# Patient Record
Sex: FEMALE | Race: WHITE
[De-identification: names, ages, dates, MRNs, and addresses within clinical notes are randomized per-mention and may not be internally consistent; named-entity substitution may affect disease eponyms.]

---

## 2018-09-05 ENCOUNTER — HOSPITAL ENCOUNTER (INPATIENT)
Dept: HOSPITAL 62 - LC | Age: 28
LOS: 2 days | Discharge: HOME | End: 2018-09-07
Attending: OBSTETRICS & GYNECOLOGY | Admitting: OBSTETRICS & GYNECOLOGY
Payer: OTHER GOVERNMENT

## 2018-09-05 DIAGNOSIS — O26.893: Primary | ICD-10-CM

## 2018-09-05 DIAGNOSIS — Z3A.39: ICD-10-CM

## 2018-09-05 DIAGNOSIS — Z28.21: ICD-10-CM

## 2018-09-05 DIAGNOSIS — Z67.21: ICD-10-CM

## 2018-09-05 LAB
ADD MANUAL DIFF: NO
APPEARANCE UR: CLEAR
APTT PPP: YELLOW S
BARBITURATES UR QL SCN: NEGATIVE
BASOPHILS # BLD AUTO: 0 10^3/UL (ref 0–0.2)
BASOPHILS NFR BLD AUTO: 0.2 % (ref 0–2)
BILIRUB UR QL STRIP: NEGATIVE
EOSINOPHIL # BLD AUTO: 0 10^3/UL (ref 0–0.6)
EOSINOPHIL NFR BLD AUTO: 0.2 % (ref 0–6)
ERYTHROCYTE [DISTWIDTH] IN BLOOD BY AUTOMATED COUNT: 14.4 % (ref 11.5–14)
GLUCOSE UR STRIP-MCNC: NEGATIVE MG/DL
HCT VFR BLD CALC: 34.4 % (ref 36–47)
HGB BLD-MCNC: 11.3 G/DL (ref 12–15.5)
KETONES UR STRIP-MCNC: NEGATIVE MG/DL
LYMPHOCYTES # BLD AUTO: 2 10^3/UL (ref 0.5–4.7)
LYMPHOCYTES NFR BLD AUTO: 15.7 % (ref 13–45)
MCH RBC QN AUTO: 27.2 PG (ref 27–33.4)
MCHC RBC AUTO-ENTMCNC: 33 G/DL (ref 32–36)
MCV RBC AUTO: 82 FL (ref 80–97)
METHADONE UR QL SCN: NEGATIVE
MONOCYTES # BLD AUTO: 0.9 10^3/UL (ref 0.1–1.4)
MONOCYTES NFR BLD AUTO: 6.9 % (ref 3–13)
NEUTROPHILS # BLD AUTO: 9.6 10^3/UL (ref 1.7–8.2)
NEUTS SEG NFR BLD AUTO: 77 % (ref 42–78)
NITRITE UR QL STRIP: NEGATIVE
PCP UR QL SCN: NEGATIVE
PH UR STRIP: 7 [PH] (ref 5–9)
PLATELET # BLD: 267 10^3/UL (ref 150–450)
PROT UR STRIP-MCNC: NEGATIVE MG/DL
RBC # BLD AUTO: 4.17 10^6/UL (ref 3.72–5.28)
SP GR UR STRIP: 1.01
TOTAL CELLS COUNTED % (AUTO): 100 %
URINE AMPHETAMINES SCREEN: NEGATIVE
URINE BENZODIAZEPINES SCREEN: NEGATIVE
URINE COCAINE SCREEN: NEGATIVE
URINE MARIJUANA (THC) SCREEN: NEGATIVE
UROBILINOGEN UR-MCNC: NEGATIVE MG/DL (ref ?–2)
WBC # BLD AUTO: 12.5 10^3/UL (ref 4–10.5)

## 2018-09-05 PROCEDURE — 86592 SYPHILIS TEST NON-TREP QUAL: CPT

## 2018-09-05 PROCEDURE — 86900 BLOOD TYPING SEROLOGIC ABO: CPT

## 2018-09-05 PROCEDURE — 36415 COLL VENOUS BLD VENIPUNCTURE: CPT

## 2018-09-05 PROCEDURE — 85461 HEMOGLOBIN FETAL: CPT

## 2018-09-05 PROCEDURE — 85027 COMPLETE CBC AUTOMATED: CPT

## 2018-09-05 PROCEDURE — 86901 BLOOD TYPING SEROLOGIC RH(D): CPT

## 2018-09-05 PROCEDURE — 80307 DRUG TEST PRSMV CHEM ANLYZR: CPT

## 2018-09-05 PROCEDURE — 86870 RBC ANTIBODY IDENTIFICATION: CPT

## 2018-09-05 PROCEDURE — 4A1HXCZ MONITORING OF PRODUCTS OF CONCEPTION, CARDIAC RATE, EXTERNAL APPROACH: ICD-10-PCS | Performed by: OBSTETRICS & GYNECOLOGY

## 2018-09-05 PROCEDURE — 86850 RBC ANTIBODY SCREEN: CPT

## 2018-09-05 PROCEDURE — 0KQM0ZZ REPAIR PERINEUM MUSCLE, OPEN APPROACH: ICD-10-PCS | Performed by: OBSTETRICS & GYNECOLOGY

## 2018-09-05 PROCEDURE — 81005 URINALYSIS: CPT

## 2018-09-05 PROCEDURE — 85025 COMPLETE CBC W/AUTO DIFF WBC: CPT

## 2018-09-05 RX ADMIN — ACETAMINOPHEN AND CODEINE PHOSPHATE PRN EACH: 300; 30 TABLET ORAL at 21:00

## 2018-09-05 RX ADMIN — IBUPROFEN SCH MG: 800 TABLET, FILM COATED ORAL at 21:00

## 2018-09-05 NOTE — ADMISSION PHYSICAL
=================================================================



=================================================================

Datetime Report Generated by CPN: 09/05/2018 19:05

   

   

=================================================================

CURRENT ADMISSION

=================================================================

   

Chief Complaint:  Uterine Contractions

Indication for Induction:  Not Applicable

Admit Impression :  Term, Intrauterine Pregnancy; Active Labor

Admit Plan:  Admit to Unit; Initiate Labor Protocol

   

=================================================================

PHYSICAL EXAM

=================================================================

   

General:  Normal

HEENT:  Normal

Neurologic:  Normal

Thyroid:  Normal

Heart:  Normal

Lungs:  Normal

Breast:  Deferred

Back:  Normal

Abdomen:  Normal

Genitourinary Exam:  Normal

Extremities:  Normal

DTRs:  Normal

Pelvic Type:  Adequate

Vital Signs:  Reviewed

   

=================================================================

VAGINAL EXAM

=================================================================

   

Dilatation:  5

Effacement:  90

Station:  0

   

=================================================================

MEMBRANES

=================================================================

   

Pooling:  Negative

Membranes:  Intact

   

=================================================================

FETUS A

=================================================================

   

Monitoring:  External US

FHR- Baseline:  120

Variability:  Moderate 6-25bpm

Decelerations:  None

FHR Category:  Category I

Fetal Presentation:  Vertex

Admit Comment:  Admit for labor.

   

=================================================================

INFORMED CONSENT

=================================================================

   

Signature:  Electronically signed by Filiberto Gatica MD ([email or web address]) on

   9/5/2018 at 19:04  with User ID: [email or web address]

## 2018-09-05 NOTE — DELIVERY SUMMARY
=================================================================

Del Sum A-C

=================================================================

Datetime Report Generated by CPN: 2018 22:31

   

   

=================================================================

DELIVERY PERSONNEL

=================================================================

   

DELIVERY PERSONNEL:  [id number]

Delivery Doctor::  Filiberto Gatica MD

Labor and Delivery Nurse::  Yana Schulte, RN

Scrub Tech/CNA:  Nadya Green, ST

   

=================================================================

MATERNAL INFORMATION

=================================================================

   

Delivery Anesthesia:  None

Medications After Delivery:  Pitocin Drip 20 Units/1000ml NSS

Estimated  Blood Loss (ml):  250

Maternal Complications:  Precipitous Labor (<3hrs)

   

=================================================================

LABOR SUMMARY

=================================================================

   

EDC:  2018 00:00

No. Babies in Womb:  1

 Attempted:  No

Labor Anesthesia:  None

   

=================================================================

LABOR INFORMATION

=================================================================

   

Reason for Induction:  Not Applicable

Onset of Labor:  2018 18:28

Complete Dilatation:  2018 19:54

Oxytocin:  N/A

Group B Beta Strep:  Negative

   

=================================================================

MEMBRANES

=================================================================

   

Membranes Rupture Method:  Spontaneous

Rupture of Membranes:  2018 19:48

Length of Rupture (hr):  0.23

Amniotic Fluid Color:  Clear

Amniotic Fluid Amount:  Small

Amniotic Fluid Odor:  Normal

   

=================================================================

STAGES OF LABOR

=================================================================

   

Stage 1 hr:  1

Stage 1 min:  26

Stage 2 hr:  0

Stage 2 min:  8

Stage 3 hr:  0

Stage 3 min:  3

Total Time in Labor hr:  1

Total Time in Labor min:  37

   

=================================================================

VAGINAL DELIVERY

=================================================================

   

Episiotomy:  None

Laceration #1:  Perineal

Laceration Extension #1:  Second Degree

Laceration #2:  None

Laceration Extension #2:  N/A

Laceration #3:  None

Laceration Extension #3:  N/A

Laceration Repair:  Yes

Laceration Repair Note:  repair with 3-0 chromic suture in usual fashion

Sponge Count Correct:  Vaginal Sweep Performed

Sharps Count Correct:  Yes

   

=================================================================

CSECTION DELIVERY

=================================================================

   

Primary Indication:  N/A

Secondary Indication:  N/A

CSection Incidence:  N/A

Labor:  N/A

Elective:  N/A

CSection Incision:  N/A

   

=================================================================

BABY A INFORMATION

=================================================================

   

Infant Delivery Date/Time:  2018 20:02

Method of Delivery:  Vaginal

Born in Route :  No

:  N/A

Forceps:  N/A

Vacuum Extraction:  N/A

Shoulder Dystocia :  No

   

=================================================================

PRESENTATION/POSITION BABY A

=================================================================

   

Presentation:  Cephalic

Cephalic Presentation:  Vertex

Vertex Position:  Right Occipital Anterior

Breech Presentation:  N/A

   

=================================================================

PLACENTA INFORMATION BABY A

=================================================================

   

Placenta Delivery Time :  2018 20:05

Placenta Method of Delivery:  Spontaneous

Placenta Status:  Delivered

   

=================================================================

APGAR SCORES BABY A

=================================================================

   

Heart Rate 1 min:  >100 bpm

Resp Effort 1 min:  Good Cry

Reflex Irritability 1 min:  Cough or Sneeze or Pulls Away

Muscle Tone 1 min:  Active Motion

Color 1 min:  Body Pink, Extremities Blue

Resuscitation Effort 1 min:  Tactile Stimulation

APGAR SCORE 1 MIN:  9

Heart Rate 5 min:  >100 bpm

Resp Effort 5 min:  Good Cry

Reflex Irritability 5 min:  Cough or Sneeze or Pulls Away

Muscle Tone 5 min:  Active Motion

Color 5 min:  Body Pink, Extremities Blue

Resuscitation Effort 5 min:  Tactile Stimulation

APGAR SCORE 5 MIN:  9

   

=================================================================

INFANT INFORMATION BABY A

=================================================================

   

Gestational Age at Delivery:  39.5

Gestational Status:  Full Term- 39- 40.6 Weeks

Infant Outcome :  Liveborn

Infant Condition :  Stable

Infant Sex:  Male

   

=================================================================

WEIGHT/LENGTH BABY A

=================================================================

   

Infant Birthweight (gm):  3690

Infant Weight (lb):  8

Infant Weight (oz):  2

Infant Length (in):  20.25

Infant Length (cm):  51.44

   

=================================================================

CORD INFORMATION BABY A

=================================================================

   

No. Cord Vessels:  3

Nuchal Cord :  N/A

Infant Suction:  None

   

=================================================================

ASSESSMENT BABY A

=================================================================

   

Infant Complications:  None

Physical Findings at Delivery:  Within Normal Limits

Skin to Skin:  Yes

Transferred To:  Remains with Mother

   

=================================================================

BABY B INFORMATION

=================================================================

   

 :  N/A

   

=================================================================

SIGNATURES

=================================================================

   

Signature:  Electronically signed by MD NORA Castillo) on

   2018 at 20:30  with User ID: [email or web address]

## 2018-09-06 LAB
ERYTHROCYTE [DISTWIDTH] IN BLOOD BY AUTOMATED COUNT: 14.4 % (ref 11.5–14)
HCT VFR BLD CALC: 27.9 % (ref 36–47)
HGB BLD-MCNC: 9.1 G/DL (ref 12–15.5)
MCH RBC QN AUTO: 27.2 PG (ref 27–33.4)
MCHC RBC AUTO-ENTMCNC: 32.6 G/DL (ref 32–36)
MCV RBC AUTO: 83 FL (ref 80–97)
PLATELET # BLD: 211 10^3/UL (ref 150–450)
RBC # BLD AUTO: 3.36 10^6/UL (ref 3.72–5.28)
WBC # BLD AUTO: 14.6 10^3/UL (ref 4–10.5)

## 2018-09-06 RX ADMIN — IBUPROFEN SCH MG: 800 TABLET, FILM COATED ORAL at 13:31

## 2018-09-06 RX ADMIN — FERROUS SULFATE TAB 325 MG (65 MG ELEMENTAL FE) SCH MG: 325 (65 FE) TAB at 17:24

## 2018-09-06 RX ADMIN — IBUPROFEN SCH: 800 TABLET, FILM COATED ORAL at 22:28

## 2018-09-06 RX ADMIN — DOCUSATE SODIUM SCH MG: 100 CAPSULE, LIQUID FILLED ORAL at 09:14

## 2018-09-06 RX ADMIN — SENNOSIDES, DOCUSATE SODIUM SCH EACH: 50; 8.6 TABLET, FILM COATED ORAL at 09:14

## 2018-09-06 RX ADMIN — FERROUS SULFATE TAB 325 MG (65 MG ELEMENTAL FE) SCH MG: 325 (65 FE) TAB at 09:14

## 2018-09-06 RX ADMIN — IBUPROFEN SCH MG: 800 TABLET, FILM COATED ORAL at 22:28

## 2018-09-06 RX ADMIN — ACETAMINOPHEN AND CODEINE PHOSPHATE PRN EACH: 300; 30 TABLET ORAL at 01:33

## 2018-09-06 RX ADMIN — Medication SCH CAP: at 09:14

## 2018-09-06 RX ADMIN — DOCUSATE SODIUM SCH MG: 100 CAPSULE, LIQUID FILLED ORAL at 17:24

## 2018-09-06 NOTE — PDOC PROGRESS REPORT
Subjective-OB


Progress Note for:: 09/06/18


Subjective: 





Doing well, no c/o, mother at BS, scant bleeding, breastfeeding





Physical Exam (OB)


Vital Signs: 


 











Temp Pulse Resp BP Pulse Ox


 


 98.1 F   93   18   99/57 L  98 


 


 09/05/18 22:51  09/05/18 22:51  09/05/18 22:51  09/05/18 22:51  09/05/18 22:51








 Intake & Output











 09/05/18 09/06/18 09/07/18





 06:59 06:59 06:59


 


Intake Total   200


 


Balance   200














- PIH/Pre-Eclampsia


DTR's: 2 +


Clonus: Negative


Headache: Absent


Epigastric Pain: No


Visual Changes: No





- Lochia


Lochia Amount: Scant < 10 ml


Lochia Color: Rubra/Red





- Abdomen


Description: Tender, Soft


Hernia Present: No


Fundal Description: Firm, Midline


Fundal Height: u/u - u/2





Objective-Diagnostic


Laboratory: 


 











  09/05/18 09/05/18 09/05/18





  18:34 19:20 19:20


 


WBC   12.5 H 


 


RBC   4.17 


 


Hgb   11.3 L 


 


Hct   34.4 L 


 


MCV   82 


 


MCH   27.2 


 


MCHC   33.0 


 


RDW   14.4 H 


 


Plt Count   267 


 


Seg Neutrophils %   77.0 


 


Lymphocytes %   15.7 


 


Monocytes %   6.9 


 


Eosinophils %   0.2 


 


Basophils %   0.2 


 


Absolute Neutrophils   9.6 H 


 


Absolute Lymphocytes   2.0 


 


Absolute Monocytes   0.9 


 


Absolute Eosinophils   0.0 


 


Absolute Basophils   0.0 


 


Urine Color  YELLOW  


 


Urine Appearance  CLEAR  


 


Urine pH  7.0  


 


Ur Specific Gravity  1.013  


 


Urine Protein  NEGATIVE  


 


Urine Glucose (UA)  NEGATIVE  


 


Urine Ketones  NEGATIVE  


 


Urine Blood  NEGATIVE  


 


Urine Nitrite  NEGATIVE  


 


Ur Leukocyte Esterase  NEGATIVE  


 


Blood Type    B NEGATIVE


 


Antibody Screen    POSITIVE














Assessment and Plan(PN)





- Assessment and Plan


(1) Vaginal delivery


Is this a current diagnosis for this admission?: Yes   





- Time Spent with Patient


Time with patient: Less than 15 minutes


Medications reviewed and adjusted accordingly: Yes





- Disposition


Anticipated Discharge: Home


Within: within 24 hours

## 2018-09-07 VITALS — DIASTOLIC BLOOD PRESSURE: 54 MMHG | SYSTOLIC BLOOD PRESSURE: 110 MMHG

## 2018-09-07 RX ADMIN — DOCUSATE SODIUM SCH MG: 100 CAPSULE, LIQUID FILLED ORAL at 10:11

## 2018-09-07 RX ADMIN — Medication SCH CAP: at 10:11

## 2018-09-07 RX ADMIN — FERROUS SULFATE TAB 325 MG (65 MG ELEMENTAL FE) SCH MG: 325 (65 FE) TAB at 10:11

## 2018-09-07 RX ADMIN — SENNOSIDES, DOCUSATE SODIUM SCH EACH: 50; 8.6 TABLET, FILM COATED ORAL at 10:11

## 2018-09-07 RX ADMIN — IBUPROFEN SCH MG: 800 TABLET, FILM COATED ORAL at 05:23

## 2018-09-07 NOTE — PDOC DISCHARGE SUMMARY
Final Diagnosis


Discharge Date: 09/07/18





- Final Diagnosis


(1) Vaginal delivery


Is this a current diagnosis for this admission?: Yes   





Discharge Data





- Discharge Medication


Prescriptions: 


Ibuprofen [Motrin 800 mg Tablet] 800 mg PO Q8 #30 tablet


Home Medications: 








Ibuprofen [Motrin 800 mg Tablet] 800 mg PO Q8 #30 tablet 09/07/18 








Reason(s) for Admission: Onset of Labor


Intrapartum Procedure(s): Spontaneous Vaginal Delivery





- Diagnosis Test


Laboratory: 


 











Temp Pulse Resp BP Pulse Ox


 


 98.1 F   83   18   110/54 L  100 


 


 09/07/18 07:41  09/07/18 07:41  09/07/18 07:41  09/07/18 07:41  09/07/18 07:41








 











  09/05/18 09/05/18 09/06/18





  18:34 19:20 07:00


 


RBC   4.17  3.36 L


 


Hgb   11.3 L  9.1 L D


 


Hct   34.4 L  27.9 L


 


Urine Opiates Screen  NEGATIVE  














- Discharge information/Instructions


Discharge Activity: Pelvic Rest, No tub bath


Discharge Diet: As Tolerated


Disposition: HOME, SELF-CARE


Follow up with: [facility]


in: 4, Weeks